# Patient Record
(demographics unavailable — no encounter records)

---

## 2024-10-15 NOTE — HISTORY OF PRESENT ILLNESS
[de-identified] : RODOLFO WEIR is a 54 year old right-hand-dominant female presenting today with 5-week history of right-sided fifth digit pain.  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  She says afterwards there was not significant pain swelling or bruising but then 1 to 2 weeks later she noticed swelling and bruising at the PIP joint.  She then ashley taped the fingers together while she was working as well as used a metallic foam splint and a larger splint as well throughout the day and at night for the last 3 to 4 weeks.  Since then she has developed some stiffness.  She states overall the pain and swelling has improved moderately but still with some discomfort.  Here today for further evaluation.

## 2024-10-15 NOTE — DISCUSSION/SUMMARY
[de-identified] : RODOLFO WEIR is a 54 year old right-hand-dominant female presenting today with 5-week history of right-sided fifth digit PIP pain after jamming her finger likely resulting in a PIP joint sprain.  X-rays negative for fracture.  Patient has developed moderate stiffness due to immobilization for 4 weeks.  Plan: 1.  Discussed discontinuing immobilization at this point 2.  Patient may use topical gels for pain relief as well as Tylenol or Motrin 3.  Recommended working on range of motion for stiffness and handout was given 4.  Patient will follow-up in 3 to 4 weeks if no improvement will consider referral to hand therapy.

## 2024-10-15 NOTE — PHYSICAL EXAM
[de-identified] : Hand/wrist/finger (right)  Inspection  Swelling: Mild fifth PIP Ecchymosis: Mild fifth PIP Scissoring: none    Palpation  Tenderness: Mild Location: Fifth PIP  Wrist Flexion  Normal.  Wrist Extension  Normal.  Wrist Radial Deviation  Normal.  Wrist Ulnar Deviation  Normal.   Wrist/Pinch/ Motor Strength  Wrist Extension: 5/5 Wrist Flexion: 5/5  : 5/5   Finger Flexion  Limited to 45 degrees actively, 90 degrees passively at the fifth PIP and MCP joint.  Finger Extension  Normal.  Sensory index  Normal   Special Tests   Finkelsteins: normal  Tinnels: normal  Phalens: normal  Pinch : normal  [de-identified] : XR of right hand Date: 10/15/2024   Views: 3 views Performed at [facility]: Yes Impression: No fracture no dislocation good alignment.  These images were personally reviewed with original findings documented as above.

## 2025-07-22 NOTE — HISTORY OF PRESENT ILLNESS
[de-identified] : 53 yo female here for CPE.  H/o HLD with worsening LDL on last labs. Now up to 209. Pt reporting father with HLD on statin. Already eating well balanced diet. Doesn't eat processed foods or high fat dairy. Now swimming 2-3 times weekly, eating better.

## 2025-07-22 NOTE — HEALTH RISK ASSESSMENT
[Good] : ~his/her~  mood as  good [Yes] : Yes [2 - 3 times a week (3 pts)] : 2 - 3  times a week (3 points) [1 or 2 (0 pts)] : 1 or 2 (0 points) [Never (0 pts)] : Never (0 points) [No] : In the past 12 months have you used drugs other than those required for medical reasons? No [0] : 2) Feeling down, depressed, or hopeless: Not at all (0) [PHQ-2 Negative - No further assessment needed] : PHQ-2 Negative - No further assessment needed [Never] : Never [Patient reported mammogram was normal] : Patient reported mammogram was normal [Patient reported PAP Smear was normal] : Patient reported PAP Smear was normal [Patient reported colonoscopy was abnormal] : Patient reported colonoscopy was abnormal [HIV test declined] : HIV test declined [Hepatitis C test declined] : Hepatitis C test declined [Employed] : employed [Audit-CScore] : 3 [[id number]] : 0 [MammogramDate] : 06/24 [PapSmearDate] : 07/25 [ColonoscopyDate] : 10/22 [ColonoscopyComments] : due 2027 [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

## 2025-07-22 NOTE — ASSESSMENT
[FreeTextEntry1] : CPE- Well exam, comprehensive labs ordered. Up to date with PAP. Colonoscopy up to date. Mammo script provided. Follow up labs, drawn today. EKG NSR. [Vaccines Reviewed] : Immunizations reviewed today. Please see immunization details in the vaccine log within the immunization flowsheet.